# Patient Record
Sex: MALE | Race: WHITE | NOT HISPANIC OR LATINO | URBAN - NONMETROPOLITAN AREA
[De-identification: names, ages, dates, MRNs, and addresses within clinical notes are randomized per-mention and may not be internally consistent; named-entity substitution may affect disease eponyms.]

---

## 2021-11-08 ENCOUNTER — IMPORTED ENCOUNTER (OUTPATIENT)
Dept: URBAN - NONMETROPOLITAN AREA CLINIC 1 | Facility: CLINIC | Age: 61
End: 2021-11-08

## 2021-11-08 PROBLEM — H25.813: Noted: 2021-11-08

## 2021-11-08 PROBLEM — H52.4: Noted: 2021-11-08

## 2021-11-08 PROCEDURE — 92004 COMPRE OPH EXAM NEW PT 1/>: CPT

## 2021-11-08 PROCEDURE — 92015 DETERMINE REFRACTIVE STATE: CPT

## 2021-11-08 NOTE — PATIENT DISCUSSION
Myopia / Loye Dooly / Presbyopia OU - Discussed diagnosis in detail with patient- New Glasses RX given today- Continue to monitor Cataracts OU- Discussed diagnosis in detail with patient- Discussed signs and symptoms of progression- Discussed UV protection- No treatment needed at this time - Continue to monitorPatient states that he is a pre- diabetic and last A1C was 5.1 but states he is not being treated for diabetes and does not check blood sugar.  Patient complains of floaters but none seen on todays exam  11/8/21

## 2022-04-09 ASSESSMENT — VISUAL ACUITY
OD_SC: 20/20-1
OS_SC: 20/20-1

## 2022-04-09 ASSESSMENT — TONOMETRY
OS_IOP_MMHG: 14
OD_IOP_MMHG: 14